# Patient Record
Sex: MALE | Race: WHITE | HISPANIC OR LATINO | Employment: STUDENT | URBAN - METROPOLITAN AREA
[De-identification: names, ages, dates, MRNs, and addresses within clinical notes are randomized per-mention and may not be internally consistent; named-entity substitution may affect disease eponyms.]

---
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
## 2024-02-21 PROBLEM — J06.9 VIRAL UPPER RESPIRATORY TRACT INFECTION: Status: RESOLVED | Noted: 2018-09-18 | Resolved: 2024-02-21

## 2024-03-23 ENCOUNTER — HOSPITAL ENCOUNTER (EMERGENCY)
Facility: HOSPITAL | Age: 19
Discharge: HOME | End: 2024-03-23
Attending: EMERGENCY MEDICINE
Payer: OTHER MISCELLANEOUS

## 2024-03-23 VITALS
DIASTOLIC BLOOD PRESSURE: 66 MMHG | HEART RATE: 56 BPM | OXYGEN SATURATION: 100 % | RESPIRATION RATE: 20 BRPM | SYSTOLIC BLOOD PRESSURE: 129 MMHG | TEMPERATURE: 97.9 F

## 2024-03-23 DIAGNOSIS — T23.221A PARTIAL THICKNESS BURN OF FINGER OF RIGHT HAND, INITIAL ENCOUNTER: Primary | ICD-10-CM

## 2024-03-23 PROCEDURE — 99284 EMERGENCY DEPT VISIT MOD MDM: CPT | Performed by: EMERGENCY MEDICINE

## 2024-03-23 PROCEDURE — 99282 EMERGENCY DEPT VISIT SF MDM: CPT

## 2024-03-23 RX ORDER — CEPHALEXIN 500 MG/1
500 CAPSULE ORAL EVERY 8 HOURS SCHEDULED
Qty: 21 CAPSULE | Refills: 0 | Status: SHIPPED | OUTPATIENT
Start: 2024-03-23 | End: 2024-03-30

## 2024-03-23 RX ORDER — GINSENG 100 MG
1 CAPSULE ORAL ONCE
Status: COMPLETED | OUTPATIENT
Start: 2024-03-23 | End: 2024-03-23

## 2024-03-23 RX ADMIN — BACITRACIN ZINC 1 SMALL APPLICATION: 500 OINTMENT TOPICAL at 15:23

## 2024-03-23 NOTE — ED PROVIDER NOTES
"History  Chief Complaint   Patient presents with    Chemical Burn     Pt presents ambulatory with c/o chemical burn from oven  on R pinky finger      HPI  Patient is 18 y.o. male with no relevant PMH. 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 Patient reports blister getting worse and unable to fully flex finger. Patient also reports pressure and numbness in finger. Patient denies fever, chills, tracking of pain into wrist, color changes.      Prior to Admission Medications   Prescriptions Last Dose Informant Patient Reported? Taking?   buPROPion (WELLBUTRIN XL) 150 mg 24 hr tablet   Yes No   Sig: Take 150 mg by mouth   citalopram (CeleXA) 20 mg tablet   Yes No   Sig: Take 20 mg by mouth daily   loratadine (CLARITIN) 10 mg tablet   No No   Sig: GIVE \"FLAKO\" 1 TABLET BY MOUTH DAILY AS NEEDED      Facility-Administered Medications: None       Past Medical History:   Diagnosis Date    Concussion        Past Surgical History:   Procedure Laterality Date    TONSILLECTOMY AND ADENOIDECTOMY         Family History   Problem Relation Age of Onset    No Known Problems Mother     No Known Problems Father      I have reviewed and agree with the history as documented.    E-Cigarette/Vaping    E-Cigarette Use Never User      E-Cigarette/Vaping Substances    Nicotine No     THC No     CBD No      Social History     Tobacco Use    Smoking status: Never    Smokeless tobacco: Never    Tobacco comments:     Per Allscripts: denies second hand smoke exposure   Vaping Use    Vaping status: Never Used        Review of Systems   Constitutional:  Negative for chills and fever.   HENT:  Negative for ear pain and sore throat.    Respiratory:  Negative for cough and shortness of breath.    Cardiovascular:  Negative for chest pain, palpitations and leg swelling.   Gastrointestinal:  Negative for abdominal pain, diarrhea, nausea and vomiting.   Genitourinary:  Negative for " dysuria, frequency and hematuria.   Musculoskeletal:  Negative for back pain and neck pain.   Skin:  Positive for wound. Negative for rash.        Finger burn/blister   Neurological:  Negative for dizziness, light-headedness and headaches.       Physical Exam  ED Triage Vitals   Temperature Pulse Respirations Blood Pressure SpO2   03/23/24 1453 03/23/24 1453 03/23/24 1453 03/23/24 1454 03/23/24 1453   97.9 °F (36.6 °C) 56 20 129/66 100 %      Temp Source Heart Rate Source Patient Position - Orthostatic VS BP Location FiO2 (%)   03/23/24 1453 03/23/24 1453 -- -- --   Oral Monitor         Pain Score       03/23/24 1453       6             Orthostatic Vital Signs  Vitals:    03/23/24 1453 03/23/24 1454   BP:  129/66   Pulse: 56        Physical Exam  Vitals reviewed.   Constitutional:       General: He is awake.   HENT:      Head: Normocephalic and atraumatic.      Mouth/Throat:      Mouth: Mucous membranes are moist.   Eyes:      Extraocular Movements: Extraocular movements intact.      Right eye: No nystagmus.      Left eye: No nystagmus.      Conjunctiva/sclera: Conjunctivae normal.      Pupils: Pupils are equal, round, and reactive to light.   Cardiovascular:      Rate and Rhythm: Normal rate and regular rhythm.      Pulses: Normal pulses.      Heart sounds: Normal heart sounds, S1 normal and S2 normal. Heart sounds not distant. No murmur heard.     No friction rub. No gallop.   Pulmonary:      Breath sounds: No stridor. No wheezing, rhonchi or rales.      Comments: CTA b/l   Abdominal:      General: Bowel sounds are normal.      Palpations: Abdomen is soft.      Tenderness: There is no abdominal tenderness.   Musculoskeletal:      Right lower leg: No edema.      Left lower leg: No edema.   Skin:     General: Skin is warm and dry.      Capillary Refill: Capillary refill takes less than 2 seconds.      Comments: Large blister across proximal and middle phalynx over PIP with surrounding erythema to right 5th digit.  "There are also some satellite burns 4th finger. Patient has limited flexion of right 5th digit secondary to blister. TTP, sensation intact, capillary refill <2 seconds.   Neurological:      Mental Status: He is alert and oriented to person, place, and time.      GCS: GCS eye subscore is 4. GCS verbal subscore is 5. GCS motor subscore is 6.         ED Medications  Medications   bacitracin topical ointment 1 small application (1 small application Topical Given by Other 3/23/24 1523)       Diagnostic Studies  Results Reviewed       None                   No orders to display         Procedures  Incision and drain    Date/Time: 3/23/2024 3:00 PM    Performed by: Ame Taveras DO  Authorized by: Ame Taveras DO  Universal Protocol:  Consent: Verbal consent obtained.  Consent given by: patient  Time out: Immediately prior to procedure a \"time out\" was called to verify the correct patient, procedure, equipment, support staff and site/side marked as required.  Timeout called at: 3/23/2024 3:00 PM.  Patient identity confirmed: verbally with patient    Patient location:  ED  Location:     Type:  Fluid collection    Location:  Upper extremity    Upper extremity location:  R small finger  Pre-procedure details:     Skin preparation:  Chloraprep  Procedure details:     Complexity:  Simple    Incision types:  Stab incision    Scalpel size: scissors.    Approach:  Open    Drainage:  Serous    Drainage amount:  Moderate    Wound treatment:  Wound left open  Post-procedure details:     Patient tolerance of procedure:  Tolerated well, no immediate complications  Comments:      Blister ruptured with scissors, pressure applied to drain serous fluid. Wound then treated with bacitracin and dressed.         ED Course         CRAFFT      Flowsheet Row Most Recent Value   CRAFFT Initial Screen: During the past 12 months, did you:    1. Drink any alcohol (more than a few sips)?  No Filed at: 03/23/2024 9467   2. Smoke any marijuana " "or hashish No Filed at: 03/23/2024 3386   3. Use anything else to get high? (\"anything else\" includes illegal drugs, over the counter and prescription drugs, and things that you sniff or 'jasmine')? No Filed at: 03/23/2024 2603                                      Medical Decision Making  Risk  OTC drugs.  Prescription drug management.        Patient is 18 y.o. male with no relevant PMH presenting for burn.  See history and physical documented above.     Given history and physical partial thickness burn to right 5th digit. Plan to unroof burn, treat topically with bacitracin, dressing and treatment with oral antibiotics.     All labs reviewed and utilized in the medical decision making process  All radiology studies independently viewed by me and interpreted by the radiologist.  I reviewed all testing with the patient.       I have reviewed the patient's vital signs, nursing notes, and other relevant tests/information. I had a detailed discussion with the patient regarding the history, exam findings, and any diagnostic results.   Plan to discharge home in stable condition with partial thickness burn to right 5th digit, follow up with burn clinic.  Discussed with patient who is agreeable to plan.  I discussed discharge instructions, need for follow-up, and oral return precautions for what to return for in addition to the written return precautions and discharge instructions, specifically highlighting areas of special concern.  The patient verbalized understanding of the discharge instructions and warnings that would necessitate return to the Emergency Department including worsening redness, warmth of finger, fever, pus drainage .  All questions the patient had were answered prior to discharge to the best of my ability.       Disposition  Final diagnoses:   Partial thickness burn of finger of right hand, initial encounter     Time reflects when diagnosis was documented in both MDM as applicable and the Disposition within " "this note       Time User Action Codes Description Comment    3/23/2024  3:23 PM Ame Taveras Add [T23.221A] Partial thickness burn of finger of right hand, initial encounter           ED Disposition       ED Disposition   Discharge    Condition   Stable    Date/Time   Sat Mar 23, 2024 1523    Comment   Flako Kumar discharge to home/self care.                   Follow-up Information       Follow up With Specialties Details Why Contact Info    [facility]    [address] [place] [address] [address] [place] [address]  [phone]            Discharge Medication List as of 3/23/2024  3:27 PM        START taking these medications    Details   cephalexin (KEFLEX) 500 mg capsule Take 1 capsule (500 mg total) by mouth every 8 (eight) hours for 7 days, Starting Sat 3/23/2024, Until Sat 3/30/2024, Normal           CONTINUE these medications which have NOT CHANGED    Details   buPROPion (WELLBUTRIN XL) 150 mg 24 hr tablet Take 150 mg by mouth, Starting Tue 1/14/2020, Historical Med      citalopram (CeleXA) 20 mg tablet Take 20 mg by mouth daily, Starting Tue 1/14/2020, Historical Med      loratadine (CLARITIN) 10 mg tablet GIVE \"FLAKO\" 1 TABLET BY MOUTH DAILY AS NEEDED, Normal           No discharge procedures on file.    PDMP Review       None             ED Provider  Attending physically available and evaluated Flako Kumar. I managed the patient along with the ED Attending.    Electronically Signed by           Ame Taveras DO  03/26/24 9560    "

## 2024-03-23 NOTE — DISCHARGE INSTRUCTIONS
You were seen in the ED for a burn on your right 5th finger. Please follow up with burn clinic and take antibiotic as directed.     Please return to ED if you have worsening redness, warmth of finger, fever, pus drainage or other concerning symptoms.

## 2024-03-23 NOTE — ED ATTENDING ATTESTATION
3/23/2024  I, Juan Estrella MD, saw and evaluated the patient. I have discussed the patient with the resident/non-physician practitioner and agree with the resident's/non-physician practitioner's findings, Plan of Care, and MDM as documented in the resident's/non-physician practitioner's note, except where noted. All available labs and Radiology studies were reviewed.  I was present for key portions of any procedure(s) performed by the resident/non-physician practitioner and I was immediately available to provide assistance.       At this point I agree with the current assessment done in the Emergency Department.  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  Pt with blistering, numbness, and decreased pain over the last two days.  No pmh.  Vss, afebrile, partial thickness burn with blister noted on 5th digit. Nvi.  Opened blister with patient with relief of numbness.  Abx, dressing, burn clinic follow up.

## 2025-08-04 ENCOUNTER — TELEPHONE (OUTPATIENT)
Age: 20
End: 2025-08-04